# Patient Record
Sex: MALE | Race: WHITE | NOT HISPANIC OR LATINO | Employment: OTHER | URBAN - METROPOLITAN AREA
[De-identification: names, ages, dates, MRNs, and addresses within clinical notes are randomized per-mention and may not be internally consistent; named-entity substitution may affect disease eponyms.]

---

## 2023-03-24 PROBLEM — I49.5 SINUS NODE DYSFUNCTION: Status: ACTIVE | Noted: 2023-03-24

## 2023-09-06 ENCOUNTER — TELEMEDICINE (OUTPATIENT)
Dept: SLEEP MEDICINE | Facility: CLINIC | Age: 88
End: 2023-09-06
Payer: MEDICARE

## 2023-09-06 VITALS — HEIGHT: 69 IN | WEIGHT: 210 LBS | BODY MASS INDEX: 31.1 KG/M2

## 2023-09-06 DIAGNOSIS — I10 ESSENTIAL HYPERTENSION: ICD-10-CM

## 2023-09-06 DIAGNOSIS — G47.33 OSA (OBSTRUCTIVE SLEEP APNEA): Primary | ICD-10-CM

## 2023-09-06 PROCEDURE — 99213 OFFICE O/P EST LOW 20 MIN: CPT | Performed by: NURSE PRACTITIONER

## 2023-09-06 PROCEDURE — 1159F MED LIST DOCD IN RCRD: CPT | Performed by: NURSE PRACTITIONER

## 2023-09-06 PROCEDURE — 1160F RVW MEDS BY RX/DR IN RCRD: CPT | Performed by: NURSE PRACTITIONER

## 2023-09-06 NOTE — PROGRESS NOTES
Chief Complaint:   Chief Complaint   Patient presents with    Sleeping Problem       HPI:    Jaspal Melara is a 87 y.o. male here establish care.  Patient sees Dr. Julia Stevens and Ni WALLACE as her primary care.  Patient has history as below:  Past Medical History:   Diagnosis Date    Alzheimer's disease     Arthritis     Bradycardia     CTS (carpal tunnel syndrome)     Frontal lobe syndrome     Hemothorax     Ribs, multiple fractures     L side 6-10     Stroke      Patient is accompanied today by a neighbor.  He is a poor historian.  Patient and neighbor state he was originally diagnosed 4 years ago in [place] of sleep apnea but never wore a CPAP and it was returned to [facility].  Patient states he has subsequently had a stroke in his been advised to revisit sleep apnea therapy.  Patient and neighbor state he does have slight snoring.  Patient has no gasping, or no witnessed apneas.  However, he does sleep alone.  Patient does deny morning headaches.  As above, he already has a diagnosis of sleep apnea although we are unable to get these reports.  It is not known if he was mild to moderate or severe in nature.    Patient states he does keep the same sleep schedule weekend and weekday going to bed 11 PM and getting up at 6 AM.  He does go to sleep easily and gets up 3 times in the night to use the restroom.  Patient's neighbor states he does nap frequently for long periods of time throughout the day.  Patient puts his [place] score of 14/24.    Patient does understand the consequences of untreated sleep apnea such as hypertension, atrial fibrillation, stroke, early onset dementia and sudden cardiac death.  He also understands we will be recommending CPAP should his new test be positive.    Social history  Is a very pleasant 87-year-old male.  Patient is a non-smoker, nondrinker.  He will have 6 cups of tea daily and 1 cup of coffee.    Family History   Problem Relation Age of Onset    Hypertension  Mother     Stroke Mother     Mental illness Father         Dementia     Dementia Father        Past Surgical History:   Procedure Laterality Date    CARDIAC ELECTROPHYSIOLOGY PROCEDURE N/A 04/06/2023    Procedure: DDD PPM Implant, C&T NSK, no meds to hold;  Surgeon: Beni Duron MD;  Location: [facility] INVASIVE LOCATION;  Service: Cardiology;  Laterality: N/A;    CARPAL TUNNEL RELEASE Right     PENILE PROSTHESIS IMPLANT      Removed on 2020    TURP / TRANSURETHRAL INCISION / DRAINAGE PROSTATE             Current medications are:   Current Outpatient Medications:     amLODIPine (NORVASC) 5 MG tablet, Take 1 tablet by mouth Daily., Disp: 30 tablet, Rfl: 0    aspirin 325 MG EC tablet, Take 1 tablet by mouth Daily., Disp: , Rfl:     atorvastatin (LIPITOR) 40 MG tablet, Take 1 tablet by mouth Every Night., Disp: 60 tablet, Rfl: 0    loratadine (Claritin) 10 MG tablet, Take 1 tablet by mouth Daily., Disp: , Rfl: .      The patient's relevant past medical, surgical, family and social history were reviewed and updated in Epic as appropriate.       Review of Systems   Constitutional:  Positive for activity change and fatigue.   Eyes:  Positive for visual disturbance.   Respiratory:  Positive for apnea, shortness of breath and wheezing.    Cardiovascular:  Positive for leg swelling.   Genitourinary:  Positive for frequency and urgency.   Musculoskeletal:  Positive for arthralgias.   Allergic/Immunologic: Positive for environmental allergies.   Neurological:  Positive for syncope.        Memory loss   Psychiatric/Behavioral:  Positive for sleep disturbance.    All other systems reviewed and are negative.      Objective:    Physical Exam  Constitutional:       Appearance: Normal appearance.   HENT:      Head: Normocephalic and atraumatic.   Pulmonary:      Effort: Pulmonary effort is normal. No respiratory distress.   Neurological:      Mental Status: He is alert.   Psychiatric:         Mood and Affect: Mood normal.          Behavior: Behavior normal.           ASSESSMENT/PLAN    Diagnoses and all orders for this visit:    1. MICHELA (obstructive sleep apnea) (Primary)  -     Home Sleep Study; Future    2. Essential hypertension  -     Home Sleep Study; Future        Counseled patient regarding multimodal approach with healthy nutrition, healthy sleep, regular physical activity, social activities, counseling, and medications. Encouraged to practice lateral sleep position. Avoid alcohol and sedatives close to bedtime.  Does have a history of sleep apnea and we have been unable to obtain his past sleep studies as patient and neighbor do not know where they were obtained other than in [place].  We will do HST and follow-up as appropriate.  Patient gave consent for video visit.      I have reviewed the results of my evaluation and impression and discussed my recommendations in detail with the patient.      Signed by  RODRIGO Cazares    September 6, 2023      CC: Julia Stevens MD Abrams, Elizabeth J, AP*

## 2023-12-12 ENCOUNTER — OFFICE VISIT (OUTPATIENT)
Dept: NEUROLOGY | Facility: CLINIC | Age: 88
End: 2023-12-12
Payer: MEDICARE

## 2023-12-12 VITALS
BODY MASS INDEX: 31.1 KG/M2 | DIASTOLIC BLOOD PRESSURE: 74 MMHG | HEART RATE: 67 BPM | TEMPERATURE: 97.5 F | WEIGHT: 210 LBS | SYSTOLIC BLOOD PRESSURE: 122 MMHG | HEIGHT: 69 IN | OXYGEN SATURATION: 96 %

## 2023-12-12 DIAGNOSIS — I10 PRIMARY HYPERTENSION: ICD-10-CM

## 2023-12-12 DIAGNOSIS — G47.33 OSA (OBSTRUCTIVE SLEEP APNEA): Primary | ICD-10-CM

## 2023-12-12 DIAGNOSIS — Q21.12 PFO (PATENT FORAMEN OVALE): ICD-10-CM

## 2023-12-12 DIAGNOSIS — Z86.73 HISTORY OF STROKE: ICD-10-CM

## 2023-12-12 PROCEDURE — 99214 OFFICE O/P EST MOD 30 MIN: CPT | Performed by: NURSE PRACTITIONER

## 2023-12-12 NOTE — LETTER
2023     Julia Stevens MD  [address] [place] [id number]    Patient: Jaspal Melara   YOB: 1935   Date of Visit: 2023     Dear Julia Stevens MD:       Thank you for referring Jaspal Melara to me for evaluation. Below are the relevant portions of my assessment and plan of care.    If you have questions, please do not hesitate to call me. I look forward to following Jaspal along with you.         Sincerely,        [facility]URO STRO[facility]        CC: No Recipients    Noel Laura ELIZABETH, APRN  [phone]  Sign when Signing Visit      Follow Up Office Visit      Encounter Date: 2023   Patient Name: Jaspal Melara  : 1935   MRN: [id number]   PCP: Julia Stevens MD    Chief Complaint:    Chief Complaint   Patient presents with   • Follow-up       History of Present Illness:     Initial stroke clinic visit 2023:Jaspal Jay is a 87 y.o. male with known medical diagnoses of hypertension, hyperlipidemia, PFO, Alzheimer's dementia, and MICHELA who presents to clinic for follow-up from his right thalamic stroke.  Patient was admitted to [facility] in February he was transferred from an OSH with complaints of left-sided weakness, dysarthria, and AMS.  Initial NIH was 2.  CT head was negative for any acute findings.  CTP with no evidence of LVO.  CTA H/N with no flow-limiting stenosis, occlusion, or aneurysm noted.  MRI brain revealed a subacute infarct in the right thalamus.  Suspected etiology was small vessel disease.  TTE revealed an apparent PFO.  Rope score was 3 indicating 0% chance stroke is due to PFO.  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  No need for OAC.  He was discharged on DAPT with aspirin 81 mg and Plavix 75 mg for 21 days followed by aspirin 325 mg monotherapy.  LDL was 107.  He was discharged on atorvastatin 40 mg nightly as well.  Holter  monitor was ordered at [facility].  Holter revealed supraventricular ectopy and heart rate fluctuations from the 30s to 180s.  He had a PPM placed in April.  Patient presents to clinic today with his daughter.  He has been compliant with all medications.  He has had no new stroke symptoms.  He reports that he does have a history of sleep apnea but does not currently have a CPAP.  Furl placed to sleep medicine.     Today's clinic visit 12/12/2023: Patient presents today for stroke follow-up accompanied by his daughter.  He states that he continues to have some generalized weakness and notes that he has some paresthesias involving his third finger of his right hand, he had some testing done and was told this was secondary to carpal tunnel.  He has some mild dysarthria that he states is baseline.  Patient stated that his CPAP has been bothering his nose he has not worn it in a while.  He did not want to go have a repeat sleep study when the last referral was placed, but feels like he might be ready to do so now.  He has continued to take aspirin 325 mg daily as well as Lipitor.      Subjective        I have reviewed and the following portions of the patient's history were updated as appropriate: past family history, past medical history, past social history, past surgical history and problem list.    Medications:     Current Outpatient Medications:   •  amLODIPine (NORVASC) 5 MG tablet, Take 1 tablet by mouth Daily., Disp: 30 tablet, Rfl: 0  •  aspirin 325 MG EC tablet, Take 1 tablet by mouth Daily., Disp: , Rfl:   •  atorvastatin (LIPITOR) 40 MG tablet, Take 1 tablet by mouth Every Night., Disp: 60 tablet, Rfl: 0  •  loratadine (Claritin) 10 MG tablet, Take 1 tablet by mouth Daily., Disp: , Rfl:     Allergies:   Allergies   Allergen Reactions   • Aricept [Donepezil] Hallucinations     Vivid dreams        Objective     Physical Exam:   Vital Signs:   Vitals:    12/12/23 1531   BP: 122/74   Pulse: 67   Temp: 97.5 °F (36.4 °C)  "  SpO2: 96%   Weight: 95.3 kg (210 lb)   Height: 175.3 cm (69.02\")     Body mass index is 31 kg/m².    Physical Exam  Vitals reviewed.   Constitutional:       Appearance: Normal appearance.   HENT:      Head: Normocephalic and atraumatic.   Eyes:      General: Lids are normal.      Extraocular Movements: Extraocular movements intact.      Pupils: Pupils are equal, round, and reactive to light.   Cardiovascular:      Rate and Rhythm: Normal rate.   Pulmonary:      Effort: Pulmonary effort is normal. No respiratory distress.   Musculoskeletal:      Cervical back: Normal range of motion.   Skin:     General: Skin is warm and dry.   Neurological:      Mental Status: He is alert and oriented to person, place, and time.      Cranial Nerves: Dysarthria present. No cranial nerve deficit.      Sensory: No sensory deficit.      Motor: Weakness present.      Coordination: Coordination is intact.   Psychiatric:         Mood and Affect: Mood normal.         Behavior: Behavior normal.       Neurological Exam  Mental Status  Alert. Oriented to person, place, and time. Mild dysarthria present. Language is fluent with no aphasia. Attention and concentration are normal.    Cranial Nerves  CN II: Visual fields full to confrontation.  CN III, IV, VI: Extraocular movements intact bilaterally. Normal lids and orbits bilaterally. Pupils equal round and reactive to light bilaterally.  CN V: Facial sensation is normal.  CN VII: Full and symmetric facial movement.  CN XI: Shoulder shrug strength is normal.  CN XII: Tongue midline without atrophy or fasciculations.    Motor  Normal muscle bulk throughout. No fasciculations present. Normal muscle tone. No abnormal involuntary movements. Strength is 5/5 in all four extremities except as noted.  BLE 4/5.    Sensory  Light touch is normal in upper and lower extremities.     Coordination    Finger-to-nose, rapid alternating movements and heel-to-shin normal bilaterally without dysmetria.  No " dysmetria out of proportion to weakness.    Gait    Wheelchair.       Procedures      NIH Stroke Scale  Time: 15:30 EST  Person Administering Scale: RODRIGO Armijo    1a  Level of consciousness: 0=alert; keenly responsive   1b. LOC questions:  0=Answers both questions correctly   1c. LOC commands: 0=Performs both tasks correctly   2.  Best Gaze: 0=normal   3.  Visual: 0=No visual loss   4. Facial Palsy: 0=Normal symmetric movement   5a.  Motor left arm: 0=No drift, limb holds 90 (or 45) degrees for full 10 seconds   5b.  Motor right arm: 0=No drift, limb holds 90 (or 45) degrees for full 10 seconds   6a. motor left le=Drift, limb holds 90 (or 45) degrees but drifts down before full 10 seconds: does not hit bed   6b  Motor right le=Drift, limb holds 90 (or 45) degrees but drifts down before full 10 seconds: does not hit bed   7. Limb Ataxia: 0=Absent   8.  Sensory: 0=Normal; no sensory loss   9. Best Language:  0=No aphasia, normal   10. Dysarthria: 0=Normal   11. Extinction and Inattention: 0=No abnormality    Total:   2       Results Reviewed:   New new labs or imaging available for my review    Assessment / Plan      Assessment/Plan:   Diagnoses and all orders for this visit:  1. History of stroke (Primary)  - Continue aspirin 325 mg daily  - Continue atorvastatin 40 mg daily  - Blood pressure goals less than 130/80  - Heart healthy diet  - Increase activity as tolerated  - Return to the ED with any additional stroke symptoms     2. MICHELA (obstructive sleep apnea)  - Does not currently have a cpap  -- Referral to Sleep Medicine, patient did not make it to the last appointment but is interested in pursing now.     3. PFO (patent foramen ovale)  - TTE revealed an apparent PFO.    - Rope score was 3 indicating 0% chance stroke is due to PFO.     4. Primary hypertension  - BP goals less than 130/80, well controlled today at 122/74     5. Hyperlipidemia LDL goal <70  -  in February  - Goal less  than 70 for secondary stroke prevention  - Continue atorvastatin 40 mg nightly        Discussed the importance of medication compliance Aspirin 325mg daily and Atorvastatin 40mg nightly and lifestyle modifications adequate control of blood pressure, adequate control of cholesterol (goal LDL <70), increased physical activity, compliance with CPAP therapy, and implementation of healthy diet to help reduce the risk of future cerebrovascular events.  Also discussed the signs symptoms that would warrant the patient return back to the emergency department including unilateral weakness, unilateral numbness, visual disturbances, loss of balance, speech difficulties, and/or a sudden severe headache.  Patient and his daughter verbalized understanding.            Follow Up:   No follow-ups on file.    RODRIGO Armijo  [facility] Neuro Stroke

## 2023-12-13 ENCOUNTER — PATIENT ROUNDING (BHMG ONLY) (OUTPATIENT)
Dept: NEUROLOGY | Facility: CLINIC | Age: 88
End: 2023-12-13
Payer: MEDICARE

## 2023-12-13 PROBLEM — Z86.73 HISTORY OF STROKE: Status: ACTIVE | Noted: 2023-12-13

## 2023-12-13 NOTE — PROGRESS NOTES
December 13, 2023    Hello, may I speak with Jaspal Melara?    My name is Yin Leonard      I am  with [facility]  [address] [facility] [address]  [facility] [address] [phone].    Before we get started may I verify your date of birth? 1935    I am calling to officially welcome you to our practice and ask about your recent visit. Is this a good time to talk? yes    Tell me about your visit with us. What things went well?  Everything went fine.  Feels like all questions were answered.  States that we did very good job of taking care of him.       We're always looking for ways to make our patients' experiences even better. Do you have recommendations on ways we may improve?  no    Overall were you satisfied with your first visit to our practice? yes       I appreciate you taking the time to speak with me today. Is there anything else I can do for you? no      Thank you, and have a great day.

## 2023-12-13 NOTE — PROGRESS NOTES
Follow Up Office Visit      Encounter Date: 2023   Patient Name: Jsapal Melara  : 1935   MRN: [id number]   PCP: Julia Stevens MD    Chief Complaint:    Chief Complaint   Patient presents with    Follow-up       History of Present Illness:     Initial stroke clinic visit 2023:Jaspal Jay is a 87 y.o. male with known medical diagnoses of hypertension, hyperlipidemia, PFO, Alzheimer's dementia, and MICHELA who presents to clinic for follow-up from his right thalamic stroke.  Patient was admitted to [facility] in February he was transferred from an OSH with complaints of left-sided weakness, dysarthria, and AMS.  Initial NIH was 2.  CT head was negative for any acute findings.  CTP with no evidence of LVO.  CTA H/N with no flow-limiting stenosis, occlusion, or aneurysm noted.  MRI brain revealed a subacute infarct in the right thalamus.  Suspected etiology was small vessel disease.  TTE revealed an apparent PFO.  Rope score was 3 indicating 0% chance stroke is due to PFO.  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  No need for OAC.  He was discharged on DAPT with aspirin 81 mg and Plavix 75 mg for 21 days followed by aspirin 325 mg monotherapy.  LDL was 107.  He was discharged on atorvastatin 40 mg nightly as well.  Holter monitor was ordered at [facility].  Holter revealed supraventricular ectopy and heart rate fluctuations from the 30s to 180s.  He had a PPM placed in April.  Patient presents to clinic today with his daughter.  He has been compliant with all medications.  He has had no new stroke symptoms.  He reports that he does have a history of sleep apnea but does not currently have a CPAP.  Furl placed to sleep medicine.     Today's clinic visit 2023: Patient presents today for stroke follow-up accompanied by his daughter.  He states that he continues to have some generalized weakness  "and notes that he has some paresthesias involving his third finger of his right hand, he had some testing done and was told this was secondary to carpal tunnel.  He has some mild dysarthria that he states is baseline.  Patient stated that his CPAP has been bothering his nose he has not worn it in a while.  He did not want to go have a repeat sleep study when the last referral was placed, but feels like he might be ready to do so now.  He has continued to take aspirin 325 mg daily as well as Lipitor.      Subjective        I have reviewed and the following portions of the patient's history were updated as appropriate: past family history, past medical history, past social history, past surgical history and problem list.    Medications:     Current Outpatient Medications:     amLODIPine (NORVASC) 5 MG tablet, Take 1 tablet by mouth Daily., Disp: 30 tablet, Rfl: 0    aspirin 325 MG EC tablet, Take 1 tablet by mouth Daily., Disp: , Rfl:     atorvastatin (LIPITOR) 40 MG tablet, Take 1 tablet by mouth Every Night., Disp: 60 tablet, Rfl: 0    loratadine (Claritin) 10 MG tablet, Take 1 tablet by mouth Daily., Disp: , Rfl:     Allergies:   Allergies   Allergen Reactions    Aricept [Donepezil] Hallucinations     Vivid dreams        Objective     Physical Exam:   Vital Signs:   Vitals:    12/12/23 1531   BP: 122/74   Pulse: 67   Temp: 97.5 °F (36.4 °C)   SpO2: 96%   Weight: 95.3 kg (210 lb)   Height: 175.3 cm (69.02\")     Body mass index is 31 kg/m².    Physical Exam  Vitals reviewed.   Constitutional:       Appearance: Normal appearance.   HENT:      Head: Normocephalic and atraumatic.   Eyes:      General: Lids are normal.      Extraocular Movements: Extraocular movements intact.      Pupils: Pupils are equal, round, and reactive to light.   Cardiovascular:      Rate and Rhythm: Normal rate.   Pulmonary:      Effort: Pulmonary effort is normal. No respiratory distress.   Musculoskeletal:      Cervical back: Normal range of " motion.   Skin:     General: Skin is warm and dry.   Neurological:      Mental Status: He is alert and oriented to person, place, and time.      Cranial Nerves: Dysarthria present. No cranial nerve deficit.      Sensory: No sensory deficit.      Motor: Weakness present.      Coordination: Coordination is intact.   Psychiatric:         Mood and Affect: Mood normal.         Behavior: Behavior normal.       Neurological Exam  Mental Status  Alert. Oriented to person, place, and time. Mild dysarthria present. Language is fluent with no aphasia. Attention and concentration are normal.    Cranial Nerves  CN II: Visual fields full to confrontation.  CN III, IV, VI: Extraocular movements intact bilaterally. Normal lids and orbits bilaterally. Pupils equal round and reactive to light bilaterally.  CN V: Facial sensation is normal.  CN VII: Full and symmetric facial movement.  CN XI: Shoulder shrug strength is normal.  CN XII: Tongue midline without atrophy or fasciculations.    Motor  Normal muscle bulk throughout. No fasciculations present. Normal muscle tone. No abnormal involuntary movements. Strength is 5/5 in all four extremities except as noted.  BLE 4/5.    Sensory  Light touch is normal in upper and lower extremities.     Coordination    Finger-to-nose, rapid alternating movements and heel-to-shin normal bilaterally without dysmetria.  No dysmetria out of proportion to weakness.    Gait    Wheelchair.       Procedures      NIH Stroke Scale  Time: 15:30 EST  Person Administering Scale: RODRIGO Armijo    1a  Level of consciousness: 0=alert; keenly responsive   1b. LOC questions:  0=Answers both questions correctly   1c. LOC commands: 0=Performs both tasks correctly   2.  Best Gaze: 0=normal   3.  Visual: 0=No visual loss   4. Facial Palsy: 0=Normal symmetric movement   5a.  Motor left arm: 0=No drift, limb holds 90 (or 45) degrees for full 10 seconds   5b.  Motor right arm: 0=No drift, limb holds 90 (or 45)  degrees for full 10 seconds   6a. motor left le=Drift, limb holds 90 (or 45) degrees but drifts down before full 10 seconds: does not hit bed   6b  Motor right le=Drift, limb holds 90 (or 45) degrees but drifts down before full 10 seconds: does not hit bed   7. Limb Ataxia: 0=Absent   8.  Sensory: 0=Normal; no sensory loss   9. Best Language:  0=No aphasia, normal   10. Dysarthria: 0=Normal   11. Extinction and Inattention: 0=No abnormality    Total:   2       Results Reviewed:   New new labs or imaging available for my review    Assessment / Plan      Assessment/Plan:   Diagnoses and all orders for this visit:  1. History of stroke (Primary)  - Continue aspirin 325 mg daily  - Continue atorvastatin 40 mg daily  - Blood pressure goals less than 130/80  - Heart healthy diet  - Increase activity as tolerated  - Return to the ED with any additional stroke symptoms     2. MICHELA (obstructive sleep apnea)  - Does not currently have a cpap  -- Referral to Sleep Medicine, patient did not make it to the last appointment but is interested in pursing now.     3. PFO (patent foramen ovale)  - TTE revealed an apparent PFO.    - Rope score was 3 indicating 0% chance stroke is due to PFO.     4. Primary hypertension  - BP goals less than 130/80, well controlled today at 122/74     5. Hyperlipidemia LDL goal <70  -  in February  - Goal less than 70 for secondary stroke prevention  - Continue atorvastatin 40 mg nightly        Discussed the importance of medication compliance Aspirin 325mg daily and Atorvastatin 40mg nightly and lifestyle modifications adequate control of blood pressure, adequate control of cholesterol (goal LDL <70), increased physical activity, compliance with CPAP therapy, and implementation of healthy diet to help reduce the risk of future cerebrovascular events.  Also discussed the signs symptoms that would warrant the patient return back to the emergency department including unilateral weakness,  unilateral numbness, visual disturbances, loss of balance, speech difficulties, and/or a sudden severe headache.  Patient and his daughter verbalized understanding.            Follow Up:   No follow-ups on file.    RODRIGO Armijo  [facility] [place] [place] [facility]

## 2024-02-02 ENCOUNTER — HOSPITAL ENCOUNTER (OUTPATIENT)
Dept: SLEEP MEDICINE | Facility: HOSPITAL | Age: 89
Discharge: HOME | End: 2024-02-02
Admitting: NURSE PRACTITIONER
Payer: MEDICARE

## 2024-02-02 VITALS — HEIGHT: 69 IN | WEIGHT: 210.1 LBS | BODY MASS INDEX: 31.12 KG/M2

## 2024-02-02 DIAGNOSIS — I10 ESSENTIAL HYPERTENSION: ICD-10-CM

## 2024-02-02 DIAGNOSIS — G47.33 OSA (OBSTRUCTIVE SLEEP APNEA): ICD-10-CM

## 2024-02-02 PROCEDURE — 95806 SLEEP STUDY UNATT&RESP EFFT: CPT

## 2024-02-06 DIAGNOSIS — I10 ESSENTIAL HYPERTENSION: ICD-10-CM

## 2024-02-06 DIAGNOSIS — G47.33 OSA (OBSTRUCTIVE SLEEP APNEA): Primary | ICD-10-CM

## 2024-11-06 ENCOUNTER — TELEPHONE (OUTPATIENT)
Dept: CARDIOLOGY | Facility: CLINIC | Age: 89
End: 2024-11-06
Payer: MEDICARE